# Patient Record
(demographics unavailable — no encounter records)

---

## 2024-10-24 NOTE — END OF VISIT
[] : Fellow [Time Spent: ___ minutes] : I have spent [unfilled] minutes of time on the encounter which excludes teaching and separately reported services. [FreeTextEntry3] : Pt seen and d/w fellow.  Will plan for an EGD at [facility].  Continue omeprazole.

## 2024-10-24 NOTE — END OF VISIT
[] : Fellow [Time Spent: ___ minutes] : I have spent [unfilled] minutes of time on the encounter which excludes teaching and separately reported services. [FreeTextEntry3] : Pt seen and d/w fellow.  Will plan for an EGD at [facility].  Continue omeprazole.

## 2024-10-24 NOTE — HISTORY OF PRESENT ILLNESS
[FreeTextEntry1] : This is a 68 year old female with past history of gastric ulcer 2003 diagnosed via EGD, essential hypertension, and hyperlipidemia who presents today to discuss abdominal pain. Patient describes abdominal pain and uneasiness over the last month. Describes these symptoms as being similar to those when she was diagnosed with gastric ulcer. States she has noticed her stools becoming more dark than usual. Denies nausea, vomiting, diarrhea, constipation. Was prescribed omeprazole by her PCP recently for these complaints. Denies use of over the counter NSAIDs. Denies alcohol use. Denies blood thinning medications. States she obtained screening colonoscopy 2023 which was reportedly normal.

## 2024-10-24 NOTE — ASSESSMENT
[FreeTextEntry1] : This is a 68 year old female with past history of gastric ulcer 2003 diagnosed via EGD, essential hypertension, and hyperlipidemia who presents today to discuss abdominal pain. Patient describes abdominal pain and uneasiness over the last month. Describes these symptoms as being similar to those when she was diagnosed with gastric ulcer. States she has noticed her stools becoming more dark than usual.  #Abdominal pain #Dark stool #History of gastric ulcer - Given history of previous gastric ulcer with similar symptoms, will need to evaluate with EGD  - Discussed procedure in explicit detail - Will perform EGD at [facility] [place] - Patient previously prescribed omeprazole by PCP. Asked patient to continue. She will confirm dosage and contact our office.   #Screneing for colorectal cancer - Most recent colonoscopy 2023 reportedly without polyps - Patient is to obtain previous record and forward to our office to determine most appropriate screening interval  Follow up post procedure  Braden Cabezas DO Gastroenterology Fellow [facility], [facility]

## 2024-10-24 NOTE — ASSESSMENT
[FreeTextEntry1] : This is a 68 year old female with past history of gastric ulcer 2003 diagnosed via EGD, essential hypertension, and hyperlipidemia who presents today to discuss abdominal pain. Patient describes abdominal pain and uneasiness over the last month. Describes these symptoms as being similar to those when she was diagnosed with gastric ulcer. States she has noticed her stools becoming more dark than usual.  #Abdominal pain #Dark stool #History of gastric ulcer - Given history of previous gastric ulcer with similar symptoms, will need to evaluate with EGD  - Discussed procedure in explicit detail - Will perform EGD at [facility] - Patient previously prescribed omeprazole by PCP. Asked patient to continue. She will confirm dosage and contact our office.   #Screneing for colorectal cancer - Most recent colonoscopy 2023 reportedly without polyps - Patient is to obtain previous record and forward to our office to determine most appropriate screening interval  Follow up post procedure  Braden Cabezas DO Gastroenterology Fellow [facility], [facility]

## 2025-02-04 NOTE — ASSESSMENT
[FreeTextEntry1] : This is a 69 year old female with past history of gastric ulcer 2003 diagnosed via EGD, essential hypertension, and hyperlipidemia who presents today to discuss intestinal metaplasia. After previous visit, patient scheduled for EGD to evaluate abdominal pain, intermittent melena, and history of gastric ulcer. Patient canceled and was lost to follow up. Presents today with similar abdominal pain, intermittent dark stools, and cramping.   #Abdominal pain #Dark stool #History of gastric ulcer #History of intestinal metaplasia - Given history of previous gastric ulcer with similar symptoms, will need to evaluate with EGD. Will also need up to date intestinal metaplasia mapping - Plan for intestinal metaplasia mapping via Mel protocol - Discussed procedure in explicit detail - Will perform EGD at [facility] or [facility] - Will perform CBC, CMP, and H. pylori breath testing today  #Screneing for colorectal cancer - Most recent reported colonoscopy 2023 without polyps, however patient may have incorrect date noted.  - Patient is to again attempt to obtain colonoscopy report - Patient does not wish to discuss screening colonoscopy prior to examination with EGD for the presenting complaints.   Follow up post procedure  Braden Cabeazs DO Gastroenterology Fellow [facility].

## 2025-02-04 NOTE — ASSESSMENT
[FreeTextEntry1] : This is a 69 year old female with past history of gastric ulcer 2003 diagnosed via EGD, essential hypertension, and hyperlipidemia who presents today to discuss intestinal metaplasia. After previous visit, patient scheduled for EGD to evaluate abdominal pain, intermittent melena, and history of gastric ulcer. Patient canceled and was lost to follow up. Presents today with similar abdominal pain, intermittent dark stools, and cramping.   #Abdominal pain #Dark stool #History of gastric ulcer #History of intestinal metaplasia - Given history of previous gastric ulcer with similar symptoms, will need to evaluate with EGD. Will also need up to date intestinal metaplasia mapping - Plan for intestinal metaplasia mapping via Mel protocol - Discussed procedure in explicit detail - Will perform EGD at [facility] or [facility] - Will perform CBC, CMP, and H. pylori breath testing today  #Screneing for colorectal cancer - Most recent reported colonoscopy 2023 without polyps, however patient may have incorrect date noted.  - Patient is to again attempt to obtain colonoscopy report - Patient does not wish to discuss screening colonoscopy prior to examination with EGD for the presenting complaints.   Follow up post procedure  Braden Cabezas DO Gastroenterology Fellow [facility].

## 2025-02-04 NOTE — END OF VISIT
[] : Fellow [Time Spent: ___ minutes] : I have spent [unfilled] minutes of time on the encounter which excludes teaching and separately reported services. [FreeTextEntry3] : 69F PMHx gastric ulcer 2003 diagnosed via EGD, HTN, and HLD presenting to discuss intestinal metaplasia noted on prior EGD. No corresponding report to review. Will request for review. Given dark stools, abdominal pain, will plan for EGD, at [facility] with plan for Mel biopsy protocol/gastric mapping to evaluate extent of intestinal metaplasia. H pylori breath test today. BW: CBC, CMP today. R/B/A/L of procedure discussed.

## 2025-02-04 NOTE — HISTORY OF PRESENT ILLNESS
[FreeTextEntry1] : This is a 68 year old female with past history of gastric ulcer 2003 diagnosed via EGD, essential hypertension, and hyperlipidemia who presents today to discuss intestinal metaplasia.   2/4/25 After previous visit, patient scheduled for EGD to evaluate abdominal pain, intermittent melena, and history of gastric ulcer. Patient canceled and was lost to follow up. Presents today with similar abdominal pain, intermittent dark stools, and cramping. States stool changes occur every few days. Denies use of over the counter NSAIDs and alcohol. Patient evaluated by her PCP, who informed her that on EGD performed 2021 she was identified to have intestinal metaplasia. Patient without history of H. pylori or relatives with gastric cancer. Patient was unable to obtain previous endoscopy report from 2023, however states this was performed at same time as EGD 2021, which she has report.  10/24/24 Patient describes abdominal pain and uneasiness over the last month. Describes these symptoms as being similar to those when she was diagnosed with gastric ulcer. States she has noticed her stools becoming more dark than usual. Denies nausea, vomiting, diarrhea, constipation. Was prescribed omeprazole by her PCP recently for these complaints. Denies use of over the counter NSAIDs. Denies alcohol use. Denies blood thinning medications. States she obtained screening colonoscopy 2023 which was reportedly normal.

## 2025-02-04 NOTE — END OF VISIT
[] : Fellow [Time Spent: ___ minutes] : I have spent [unfilled] minutes of time on the encounter which excludes teaching and separately reported services. [FreeTextEntry3] : 69F PMHx gastric ulcer 2003 diagnosed via EGD, HTN, and HLD presenting to discuss intestinal metaplasia noted on prior EGD. No corresponding report to review. Will request for review. Given dark stools, abdominal pain, will plan for EGD, at [facility] with plan for Mel biopsy protocol/gastric mapping to evaluate extent of intestinal metaplasia. H pylori breath test today. BW: CBC, CMP today. R/B/A/L of procedure discussed.